# Patient Record
Sex: FEMALE | Race: WHITE | NOT HISPANIC OR LATINO | Employment: FULL TIME | URBAN - METROPOLITAN AREA
[De-identification: names, ages, dates, MRNs, and addresses within clinical notes are randomized per-mention and may not be internally consistent; named-entity substitution may affect disease eponyms.]

---

## 2024-02-14 ENCOUNTER — APPOINTMENT (OUTPATIENT)
Dept: ADMISSIONS | Facility: MEDICAL CENTER | Age: 79
End: 2024-02-14
Attending: ORTHOPAEDIC SURGERY
Payer: OTHER MISCELLANEOUS

## 2024-02-16 ENCOUNTER — PRE-ADMISSION TESTING (OUTPATIENT)
Dept: ADMISSIONS | Facility: MEDICAL CENTER | Age: 79
End: 2024-02-16
Attending: ORTHOPAEDIC SURGERY
Payer: OTHER MISCELLANEOUS

## 2024-02-16 RX ORDER — FLUTICASONE PROPIONATE AND SALMETEROL 500; 50 UG/1; UG/1
POWDER RESPIRATORY (INHALATION)
COMMUNITY
Start: 2006-01-01 | End: 2024-06-29

## 2024-02-16 RX ORDER — MULTIVIT-MIN/IRON/FOLIC ACID/K 18-600-40
1000 CAPSULE ORAL DAILY
COMMUNITY

## 2024-02-16 RX ORDER — PANTOPRAZOLE SODIUM 40 MG/1
TABLET, DELAYED RELEASE ORAL EVERY MORNING
COMMUNITY
Start: 2006-01-01

## 2024-02-16 RX ORDER — LEVOTHYROXINE SODIUM 112 UG/1
TABLET ORAL EVERY MORNING
COMMUNITY
Start: 2023-05-02

## 2024-02-16 RX ORDER — LISINOPRIL 10 MG/1
TABLET ORAL
Status: ON HOLD | COMMUNITY
Start: 2004-01-01 | End: 2024-02-21

## 2024-02-16 RX ORDER — EMPAGLIFLOZIN 10 MG/1
TABLET, FILM COATED ORAL EVERY MORNING
COMMUNITY
Start: 2022-01-01

## 2024-02-16 RX ORDER — ALBUTEROL SULFATE 90 UG/1
AEROSOL, METERED RESPIRATORY (INHALATION) PRN
COMMUNITY
Start: 2023-12-07

## 2024-02-16 RX ORDER — ATORVASTATIN CALCIUM 10 MG/1
TABLET, FILM COATED ORAL EVERY MORNING
COMMUNITY
Start: 2015-01-01

## 2024-02-16 RX ORDER — GLIPIZIDE 5 MG/1
TABLET, FILM COATED, EXTENDED RELEASE ORAL 2 TIMES DAILY
COMMUNITY
Start: 2023-04-12

## 2024-02-16 NOTE — PREPROCEDURE INSTRUCTIONS
PreAdmit Telephone Appointment: Reviewed the Preparing for your procedure handout with patient over the phone. Patient instructed per pharmacy guidelines regarding taking, holding or contacting provider for instructions on regularly prescribed medications before surgery. Instructed to take the following medications the day of surgery with a sip of water per pharmacy medication guidelines: Atorvastatin, Levothyroxine, Pantoprazole, Wixela, albuterol if needed  Pt denies issues with anesthesia   Preadmitted 2/16, pt lives in [place] so preop testing will be on admit      Confirmed with patient where to check in morning of surgery. Handouts/Brochure/Video emailed to patient.

## 2024-02-19 ENCOUNTER — HOSPITAL ENCOUNTER (OUTPATIENT)
Facility: MEDICAL CENTER | Age: 79
End: 2024-02-21
Attending: ORTHOPAEDIC SURGERY | Admitting: ORTHOPAEDIC SURGERY
Payer: OTHER MISCELLANEOUS

## 2024-02-19 ENCOUNTER — HOSPITAL ENCOUNTER (EMERGENCY)
Facility: MEDICAL CENTER | Age: 79
End: 2024-02-19

## 2024-02-19 ENCOUNTER — APPOINTMENT (OUTPATIENT)
Dept: RADIOLOGY | Facility: MEDICAL CENTER | Age: 79
End: 2024-02-19
Attending: ANESTHESIOLOGY
Payer: OTHER MISCELLANEOUS

## 2024-02-19 ENCOUNTER — ANESTHESIA (OUTPATIENT)
Dept: SURGERY | Facility: MEDICAL CENTER | Age: 79
End: 2024-02-19
Payer: OTHER MISCELLANEOUS

## 2024-02-19 ENCOUNTER — ANESTHESIA EVENT (OUTPATIENT)
Dept: SURGERY | Facility: MEDICAL CENTER | Age: 79
End: 2024-02-19
Payer: OTHER MISCELLANEOUS

## 2024-02-19 DIAGNOSIS — M75.121 NONTRAUMATIC COMPLETE TEAR OF RIGHT ROTATOR CUFF: ICD-10-CM

## 2024-02-19 DIAGNOSIS — J44.9 CHRONIC OBSTRUCTIVE PULMONARY DISEASE, UNSPECIFIED COPD TYPE (HCC): ICD-10-CM

## 2024-02-19 DIAGNOSIS — I10 HYPERTENSION, UNSPECIFIED TYPE: ICD-10-CM

## 2024-02-19 PROBLEM — M75.101 RIGHT ROTATOR CUFF TEAR: Status: ACTIVE | Noted: 2024-02-19

## 2024-02-19 LAB
ANION GAP SERPL CALC-SCNC: 10 MMOL/L (ref 7–16)
BUN SERPL-MCNC: 17 MG/DL (ref 8–22)
CALCIUM SERPL-MCNC: 9 MG/DL (ref 8.4–10.2)
CHLORIDE SERPL-SCNC: 101 MMOL/L (ref 96–112)
CO2 SERPL-SCNC: 27 MMOL/L (ref 20–33)
CREAT SERPL-MCNC: 0.62 MG/DL (ref 0.5–1.4)
EKG IMPRESSION: NORMAL
GFR SERPLBLD CREATININE-BSD FMLA CKD-EPI: 92 ML/MIN/1.73 M 2
GLUCOSE BLD STRIP.AUTO-MCNC: 130 MG/DL (ref 65–99)
GLUCOSE BLD STRIP.AUTO-MCNC: 130 MG/DL (ref 65–99)
GLUCOSE BLD STRIP.AUTO-MCNC: 163 MG/DL (ref 65–99)
GLUCOSE SERPL-MCNC: 159 MG/DL (ref 65–99)
POTASSIUM SERPL-SCNC: 4.4 MMOL/L (ref 3.6–5.5)
SODIUM SERPL-SCNC: 138 MMOL/L (ref 135–145)

## 2024-02-19 PROCEDURE — 160002 HCHG RECOVERY MINUTES (STAT): Performed by: ORTHOPAEDIC SURGERY

## 2024-02-19 PROCEDURE — 160009 HCHG ANES TIME/MIN: Performed by: ORTHOPAEDIC SURGERY

## 2024-02-19 PROCEDURE — 700105 HCHG RX REV CODE 258: Performed by: ORTHOPAEDIC SURGERY

## 2024-02-19 PROCEDURE — 502240 HCHG MISC OR SUPPLY RC 0272: Performed by: ORTHOPAEDIC SURGERY

## 2024-02-19 PROCEDURE — A9270 NON-COVERED ITEM OR SERVICE: HCPCS | Performed by: ORTHOPAEDIC SURGERY

## 2024-02-19 PROCEDURE — 94640 AIRWAY INHALATION TREATMENT: CPT

## 2024-02-19 PROCEDURE — 700111 HCHG RX REV CODE 636 W/ 250 OVERRIDE (IP): Mod: JZ | Performed by: ANESTHESIOLOGY

## 2024-02-19 PROCEDURE — 700102 HCHG RX REV CODE 250 W/ 637 OVERRIDE(OP): Performed by: ORTHOPAEDIC SURGERY

## 2024-02-19 PROCEDURE — 93005 ELECTROCARDIOGRAM TRACING: CPT | Performed by: ORTHOPAEDIC SURGERY

## 2024-02-19 PROCEDURE — 94760 N-INVAS EAR/PLS OXIMETRY 1: CPT

## 2024-02-19 PROCEDURE — 160036 HCHG PACU - EA ADDL 30 MINS PHASE I: Performed by: ORTHOPAEDIC SURGERY

## 2024-02-19 PROCEDURE — 700102 HCHG RX REV CODE 250 W/ 637 OVERRIDE(OP)

## 2024-02-19 PROCEDURE — 700101 HCHG RX REV CODE 250: Performed by: ANESTHESIOLOGY

## 2024-02-19 PROCEDURE — 700105 HCHG RX REV CODE 258: Performed by: ANESTHESIOLOGY

## 2024-02-19 PROCEDURE — 93010 ELECTROCARDIOGRAM REPORT: CPT | Performed by: INTERNAL MEDICINE

## 2024-02-19 PROCEDURE — 700101 HCHG RX REV CODE 250: Performed by: ORTHOPAEDIC SURGERY

## 2024-02-19 PROCEDURE — 80048 BASIC METABOLIC PNL TOTAL CA: CPT

## 2024-02-19 PROCEDURE — 700102 HCHG RX REV CODE 250 W/ 637 OVERRIDE(OP): Performed by: ANESTHESIOLOGY

## 2024-02-19 PROCEDURE — 64415 NJX AA&/STRD BRCH PLXS IMG: CPT | Performed by: ORTHOPAEDIC SURGERY

## 2024-02-19 PROCEDURE — C1713 ANCHOR/SCREW BN/BN,TIS/BN: HCPCS | Performed by: ORTHOPAEDIC SURGERY

## 2024-02-19 PROCEDURE — A9270 NON-COVERED ITEM OR SERVICE: HCPCS

## 2024-02-19 PROCEDURE — 82962 GLUCOSE BLOOD TEST: CPT | Mod: 91

## 2024-02-19 PROCEDURE — 36415 COLL VENOUS BLD VENIPUNCTURE: CPT

## 2024-02-19 PROCEDURE — 770030 HCHG ROOM/CARE - EXTENDED RECOVERY EACH 15 MIN

## 2024-02-19 PROCEDURE — 502000 HCHG MISC OR IMPLANTS RC 0278: Performed by: ORTHOPAEDIC SURGERY

## 2024-02-19 PROCEDURE — 160048 HCHG OR STATISTICAL LEVEL 1-5: Performed by: ORTHOPAEDIC SURGERY

## 2024-02-19 PROCEDURE — 700111 HCHG RX REV CODE 636 W/ 250 OVERRIDE (IP): Performed by: ORTHOPAEDIC SURGERY

## 2024-02-19 PROCEDURE — A9270 NON-COVERED ITEM OR SERVICE: HCPCS | Performed by: ANESTHESIOLOGY

## 2024-02-19 PROCEDURE — 160041 HCHG SURGERY MINUTES - EA ADDL 1 MIN LEVEL 4: Performed by: ORTHOPAEDIC SURGERY

## 2024-02-19 PROCEDURE — 71045 X-RAY EXAM CHEST 1 VIEW: CPT

## 2024-02-19 PROCEDURE — 160035 HCHG PACU - 1ST 60 MINS PHASE I: Performed by: ORTHOPAEDIC SURGERY

## 2024-02-19 PROCEDURE — 160029 HCHG SURGERY MINUTES - 1ST 30 MINS LEVEL 4: Performed by: ORTHOPAEDIC SURGERY

## 2024-02-19 DEVICE — IMPLANTABLE DEVICE: Type: IMPLANTABLE DEVICE | Status: FUNCTIONAL

## 2024-02-19 RX ORDER — ONDANSETRON 2 MG/ML
4 INJECTION INTRAMUSCULAR; INTRAVENOUS EVERY 4 HOURS PRN
Status: DISCONTINUED | OUTPATIENT
Start: 2024-02-19 | End: 2024-02-21 | Stop reason: HOSPADM

## 2024-02-19 RX ORDER — MIDAZOLAM HYDROCHLORIDE 1 MG/ML
INJECTION INTRAMUSCULAR; INTRAVENOUS PRN
Status: DISCONTINUED | OUTPATIENT
Start: 2024-02-19 | End: 2024-02-19 | Stop reason: SURG

## 2024-02-19 RX ORDER — OMEPRAZOLE 20 MG/1
20 CAPSULE, DELAYED RELEASE ORAL DAILY
Status: DISCONTINUED | OUTPATIENT
Start: 2024-02-19 | End: 2024-02-21 | Stop reason: HOSPADM

## 2024-02-19 RX ORDER — ROCURONIUM BROMIDE 10 MG/ML
INJECTION, SOLUTION INTRAVENOUS PRN
Status: DISCONTINUED | OUTPATIENT
Start: 2024-02-19 | End: 2024-02-19 | Stop reason: SURG

## 2024-02-19 RX ORDER — HYDROMORPHONE HYDROCHLORIDE 1 MG/ML
0.1 INJECTION, SOLUTION INTRAMUSCULAR; INTRAVENOUS; SUBCUTANEOUS
Status: DISCONTINUED | OUTPATIENT
Start: 2024-02-19 | End: 2024-02-19 | Stop reason: HOSPADM

## 2024-02-19 RX ORDER — GLIPIZIDE 2.5 MG/1
5 TABLET, EXTENDED RELEASE ORAL 2 TIMES DAILY
Status: DISCONTINUED | OUTPATIENT
Start: 2024-02-20 | End: 2024-02-21 | Stop reason: HOSPADM

## 2024-02-19 RX ORDER — BUPIVACAINE HYDROCHLORIDE 2.5 MG/ML
INJECTION, SOLUTION EPIDURAL; INFILTRATION; INTRACAUDAL
Status: COMPLETED
Start: 2024-02-19 | End: 2024-02-19

## 2024-02-19 RX ORDER — OXYCODONE HYDROCHLORIDE 5 MG/1
5 TABLET ORAL
Status: DISCONTINUED | OUTPATIENT
Start: 2024-02-19 | End: 2024-02-21 | Stop reason: HOSPADM

## 2024-02-19 RX ORDER — HALOPERIDOL 5 MG/ML
1 INJECTION INTRAMUSCULAR
Status: DISCONTINUED | OUTPATIENT
Start: 2024-02-19 | End: 2024-02-19 | Stop reason: HOSPADM

## 2024-02-19 RX ORDER — BUPIVACAINE HYDROCHLORIDE 2.5 MG/ML
INJECTION, SOLUTION EPIDURAL; INFILTRATION; INTRACAUDAL PRN
Status: DISCONTINUED | OUTPATIENT
Start: 2024-02-19 | End: 2024-02-19 | Stop reason: SURG

## 2024-02-19 RX ORDER — FLUTICASONE PROPIONATE AND SALMETEROL 500; 50 UG/1; UG/1
1 POWDER RESPIRATORY (INHALATION)
Status: DISCONTINUED | OUTPATIENT
Start: 2024-02-19 | End: 2024-02-19

## 2024-02-19 RX ORDER — ONDANSETRON 2 MG/ML
4 INJECTION INTRAMUSCULAR; INTRAVENOUS
Status: DISCONTINUED | OUTPATIENT
Start: 2024-02-19 | End: 2024-02-19 | Stop reason: HOSPADM

## 2024-02-19 RX ORDER — LEVOTHYROXINE SODIUM 112 UG/1
112 TABLET ORAL EVERY MORNING
Status: DISCONTINUED | OUTPATIENT
Start: 2024-02-19 | End: 2024-02-21 | Stop reason: HOSPADM

## 2024-02-19 RX ORDER — EPHEDRINE SULFATE 50 MG/ML
5 INJECTION, SOLUTION INTRAVENOUS
Status: DISCONTINUED | OUTPATIENT
Start: 2024-02-19 | End: 2024-02-19 | Stop reason: HOSPADM

## 2024-02-19 RX ORDER — OXYCODONE HYDROCHLORIDE 10 MG/1
10 TABLET ORAL
Status: DISCONTINUED | OUTPATIENT
Start: 2024-02-19 | End: 2024-02-21 | Stop reason: HOSPADM

## 2024-02-19 RX ORDER — SODIUM CHLORIDE, SODIUM LACTATE, POTASSIUM CHLORIDE, CALCIUM CHLORIDE 600; 310; 30; 20 MG/100ML; MG/100ML; MG/100ML; MG/100ML
INJECTION, SOLUTION INTRAVENOUS CONTINUOUS
Status: DISCONTINUED | OUTPATIENT
Start: 2024-02-19 | End: 2024-02-20

## 2024-02-19 RX ORDER — HYDROMORPHONE HYDROCHLORIDE 1 MG/ML
0.4 INJECTION, SOLUTION INTRAMUSCULAR; INTRAVENOUS; SUBCUTANEOUS
Status: DISCONTINUED | OUTPATIENT
Start: 2024-02-19 | End: 2024-02-19 | Stop reason: HOSPADM

## 2024-02-19 RX ORDER — SODIUM CHLORIDE, SODIUM LACTATE, POTASSIUM CHLORIDE, CALCIUM CHLORIDE 600; 310; 30; 20 MG/100ML; MG/100ML; MG/100ML; MG/100ML
INJECTION, SOLUTION INTRAVENOUS
Status: DISCONTINUED | OUTPATIENT
Start: 2024-02-19 | End: 2024-02-19 | Stop reason: SURG

## 2024-02-19 RX ORDER — ONDANSETRON 2 MG/ML
INJECTION INTRAMUSCULAR; INTRAVENOUS PRN
Status: DISCONTINUED | OUTPATIENT
Start: 2024-02-19 | End: 2024-02-19 | Stop reason: SURG

## 2024-02-19 RX ORDER — EPINEPHRINE 1 MG/ML(1)
AMPUL (ML) INJECTION PRN
Status: DISCONTINUED | OUTPATIENT
Start: 2024-02-19 | End: 2024-02-19 | Stop reason: SURG

## 2024-02-19 RX ORDER — HYDROMORPHONE HYDROCHLORIDE 1 MG/ML
0.2 INJECTION, SOLUTION INTRAMUSCULAR; INTRAVENOUS; SUBCUTANEOUS
Status: DISCONTINUED | OUTPATIENT
Start: 2024-02-19 | End: 2024-02-19 | Stop reason: HOSPADM

## 2024-02-19 RX ORDER — CEFAZOLIN SODIUM 1 G/3ML
INJECTION, POWDER, FOR SOLUTION INTRAMUSCULAR; INTRAVENOUS PRN
Status: DISCONTINUED | OUTPATIENT
Start: 2024-02-19 | End: 2024-02-19 | Stop reason: SURG

## 2024-02-19 RX ORDER — SODIUM CHLORIDE, SODIUM LACTATE, POTASSIUM CHLORIDE, CALCIUM CHLORIDE 600; 310; 30; 20 MG/100ML; MG/100ML; MG/100ML; MG/100ML
INJECTION, SOLUTION INTRAVENOUS CONTINUOUS
Status: ACTIVE | OUTPATIENT
Start: 2024-02-19 | End: 2024-02-19

## 2024-02-19 RX ORDER — VASOPRESSIN 20 U/ML
INJECTION PARENTERAL PRN
Status: DISCONTINUED | OUTPATIENT
Start: 2024-02-19 | End: 2024-02-19 | Stop reason: SURG

## 2024-02-19 RX ORDER — ACETAMINOPHEN 325 MG/1
650 TABLET ORAL EVERY 6 HOURS
Status: DISCONTINUED | OUTPATIENT
Start: 2024-02-19 | End: 2024-02-21 | Stop reason: HOSPADM

## 2024-02-19 RX ORDER — TRANEXAMIC ACID 100 MG/ML
INJECTION, SOLUTION INTRAVENOUS PRN
Status: DISCONTINUED | OUTPATIENT
Start: 2024-02-19 | End: 2024-02-19 | Stop reason: SURG

## 2024-02-19 RX ORDER — LIDOCAINE HYDROCHLORIDE 20 MG/ML
INJECTION, SOLUTION EPIDURAL; INFILTRATION; INTRACAUDAL; PERINEURAL PRN
Status: DISCONTINUED | OUTPATIENT
Start: 2024-02-19 | End: 2024-02-19 | Stop reason: SURG

## 2024-02-19 RX ORDER — DEXTROSE MONOHYDRATE 25 G/50ML
25 INJECTION, SOLUTION INTRAVENOUS
Status: DISCONTINUED | OUTPATIENT
Start: 2024-02-19 | End: 2024-02-21 | Stop reason: HOSPADM

## 2024-02-19 RX ORDER — ATORVASTATIN CALCIUM 10 MG/1
10 TABLET, FILM COATED ORAL EVERY MORNING
Status: DISCONTINUED | OUTPATIENT
Start: 2024-02-19 | End: 2024-02-21 | Stop reason: HOSPADM

## 2024-02-19 RX ORDER — ASPIRIN 325 MG
325 TABLET ORAL DAILY
Status: DISCONTINUED | OUTPATIENT
Start: 2024-02-20 | End: 2024-02-21 | Stop reason: HOSPADM

## 2024-02-19 RX ORDER — ACETAMINOPHEN 325 MG/1
650 TABLET ORAL EVERY 6 HOURS PRN
Status: DISCONTINUED | OUTPATIENT
Start: 2024-02-24 | End: 2024-02-21 | Stop reason: HOSPADM

## 2024-02-19 RX ORDER — LISINOPRIL 5 MG/1
10 TABLET ORAL
Status: DISCONTINUED | OUTPATIENT
Start: 2024-02-19 | End: 2024-02-21

## 2024-02-19 RX ORDER — ALBUTEROL SULFATE 90 UG/1
AEROSOL, METERED RESPIRATORY (INHALATION) PRN
Status: DISCONTINUED | OUTPATIENT
Start: 2024-02-19 | End: 2024-02-19 | Stop reason: SURG

## 2024-02-19 RX ORDER — OXYCODONE HCL 5 MG/5 ML
10 SOLUTION, ORAL ORAL
Status: DISCONTINUED | OUTPATIENT
Start: 2024-02-19 | End: 2024-02-19 | Stop reason: HOSPADM

## 2024-02-19 RX ORDER — HYDRALAZINE HYDROCHLORIDE 20 MG/ML
5 INJECTION INTRAMUSCULAR; INTRAVENOUS
Status: DISCONTINUED | OUTPATIENT
Start: 2024-02-19 | End: 2024-02-19 | Stop reason: HOSPADM

## 2024-02-19 RX ORDER — GLIPIZIDE 2.5 MG/1
5 TABLET, EXTENDED RELEASE ORAL 2 TIMES DAILY
Status: DISCONTINUED | OUTPATIENT
Start: 2024-02-19 | End: 2024-02-19

## 2024-02-19 RX ORDER — BUPIVACAINE HYDROCHLORIDE AND EPINEPHRINE 2.5; 5 MG/ML; UG/ML
INJECTION, SOLUTION EPIDURAL; INFILTRATION; INTRACAUDAL; PERINEURAL
Status: DISCONTINUED | OUTPATIENT
Start: 2024-02-19 | End: 2024-02-19 | Stop reason: HOSPADM

## 2024-02-19 RX ORDER — METOPROLOL TARTRATE 1 MG/ML
1 INJECTION, SOLUTION INTRAVENOUS
Status: DISCONTINUED | OUTPATIENT
Start: 2024-02-19 | End: 2024-02-19 | Stop reason: HOSPADM

## 2024-02-19 RX ORDER — SODIUM CHLORIDE, SODIUM LACTATE, POTASSIUM CHLORIDE, CALCIUM CHLORIDE 600; 310; 30; 20 MG/100ML; MG/100ML; MG/100ML; MG/100ML
INJECTION, SOLUTION INTRAVENOUS CONTINUOUS
Status: DISCONTINUED | OUTPATIENT
Start: 2024-02-19 | End: 2024-02-19 | Stop reason: HOSPADM

## 2024-02-19 RX ORDER — ALBUTEROL SULFATE 90 UG/1
2 AEROSOL, METERED RESPIRATORY (INHALATION) EVERY 4 HOURS PRN
Status: DISCONTINUED | OUTPATIENT
Start: 2024-02-19 | End: 2024-02-21 | Stop reason: HOSPADM

## 2024-02-19 RX ORDER — OXYCODONE HCL 5 MG/5 ML
5 SOLUTION, ORAL ORAL
Status: DISCONTINUED | OUTPATIENT
Start: 2024-02-19 | End: 2024-02-19 | Stop reason: HOSPADM

## 2024-02-19 RX ORDER — PANTOPRAZOLE SODIUM 40 MG/1
40 TABLET, DELAYED RELEASE ORAL DAILY
Status: DISCONTINUED | OUTPATIENT
Start: 2024-02-19 | End: 2024-02-19

## 2024-02-19 RX ORDER — ALBUTEROL SULFATE 90 UG/1
2 AEROSOL, METERED RESPIRATORY (INHALATION)
Status: DISCONTINUED | OUTPATIENT
Start: 2024-02-19 | End: 2024-02-19

## 2024-02-19 RX ORDER — LABETALOL HYDROCHLORIDE 5 MG/ML
5 INJECTION, SOLUTION INTRAVENOUS
Status: DISCONTINUED | OUTPATIENT
Start: 2024-02-19 | End: 2024-02-19 | Stop reason: HOSPADM

## 2024-02-19 RX ORDER — HYDROMORPHONE HYDROCHLORIDE 1 MG/ML
0.5 INJECTION, SOLUTION INTRAMUSCULAR; INTRAVENOUS; SUBCUTANEOUS
Status: DISCONTINUED | OUTPATIENT
Start: 2024-02-19 | End: 2024-02-21 | Stop reason: HOSPADM

## 2024-02-19 RX ADMIN — ACETAMINOPHEN 650 MG: 325 TABLET ORAL at 11:09

## 2024-02-19 RX ADMIN — CEFAZOLIN 2 G: 1 INJECTION, POWDER, FOR SOLUTION INTRAMUSCULAR; INTRAVENOUS at 07:15

## 2024-02-19 RX ADMIN — ATORVASTATIN CALCIUM 10 MG: 10 TABLET, FILM COATED ORAL at 10:32

## 2024-02-19 RX ADMIN — MOMETASONE FUROATE AND FORMOTEROL FUMARATE DIHYDRATE 2 PUFF: 200; 5 AEROSOL RESPIRATORY (INHALATION) at 20:42

## 2024-02-19 RX ADMIN — ALBUTEROL SULFATE 4 PUFF: 90 AEROSOL, METERED RESPIRATORY (INHALATION) at 08:22

## 2024-02-19 RX ADMIN — VASOPRESSIN 1 UNITS: 20 INJECTION INTRAVENOUS at 08:06

## 2024-02-19 RX ADMIN — LISINOPRIL 10 MG: 5 TABLET ORAL at 10:32

## 2024-02-19 RX ADMIN — SUGAMMADEX 400 MG: 100 INJECTION, SOLUTION INTRAVENOUS at 08:26

## 2024-02-19 RX ADMIN — PROPOFOL 160 MG: 10 INJECTION, EMULSION INTRAVENOUS at 07:15

## 2024-02-19 RX ADMIN — EPINEPHRINE 5 MCG: 1 INJECTION, SOLUTION INTRAMUSCULAR; SUBCUTANEOUS at 08:14

## 2024-02-19 RX ADMIN — LEVOTHYROXINE SODIUM 112 MCG: 0.11 TABLET ORAL at 10:32

## 2024-02-19 RX ADMIN — ACETAMINOPHEN 650 MG: 325 TABLET ORAL at 23:18

## 2024-02-19 RX ADMIN — TRANEXAMIC ACID 1000 MG: 100 INJECTION, SOLUTION INTRAVENOUS at 07:15

## 2024-02-19 RX ADMIN — ROCURONIUM BROMIDE 100 MG: 50 INJECTION, SOLUTION INTRAVENOUS at 07:15

## 2024-02-19 RX ADMIN — CEFAZOLIN 2 G: 2 INJECTION, POWDER, FOR SOLUTION INTRAMUSCULAR; INTRAVENOUS at 10:27

## 2024-02-19 RX ADMIN — BUPIVACAINE HYDROCHLORIDE 20 ML: 2.5 INJECTION, SOLUTION EPIDURAL; INFILTRATION; INTRACAUDAL at 07:02

## 2024-02-19 RX ADMIN — ONDANSETRON 4 MG: 2 INJECTION INTRAMUSCULAR; INTRAVENOUS at 08:21

## 2024-02-19 RX ADMIN — VASOPRESSIN 1 UNITS: 20 INJECTION INTRAVENOUS at 07:24

## 2024-02-19 RX ADMIN — MIDAZOLAM HYDROCHLORIDE 0.5 MG: 1 INJECTION, SOLUTION INTRAMUSCULAR; INTRAVENOUS at 06:58

## 2024-02-19 RX ADMIN — ALBUTEROL SULFATE 2 PUFF: 90 AEROSOL, METERED RESPIRATORY (INHALATION) at 11:08

## 2024-02-19 RX ADMIN — EPINEPHRINE 5 MCG: 1 INJECTION, SOLUTION INTRAMUSCULAR; SUBCUTANEOUS at 07:23

## 2024-02-19 RX ADMIN — GLIPIZIDE 5 MG: 2.5 TABLET, EXTENDED RELEASE ORAL at 14:33

## 2024-02-19 RX ADMIN — EPINEPHRINE 5 MCG: 1 INJECTION, SOLUTION INTRAMUSCULAR; SUBCUTANEOUS at 08:06

## 2024-02-19 RX ADMIN — OMEPRAZOLE 20 MG: 20 CAPSULE, DELAYED RELEASE ORAL at 10:32

## 2024-02-19 RX ADMIN — FENTANYL CITRATE 50 MCG: 50 INJECTION, SOLUTION INTRAMUSCULAR; INTRAVENOUS at 07:15

## 2024-02-19 RX ADMIN — SODIUM CHLORIDE, POTASSIUM CHLORIDE, SODIUM LACTATE AND CALCIUM CHLORIDE: 600; 310; 30; 20 INJECTION, SOLUTION INTRAVENOUS at 07:08

## 2024-02-19 RX ADMIN — FENTANYL CITRATE 50 MCG: 50 INJECTION, SOLUTION INTRAMUSCULAR; INTRAVENOUS at 06:58

## 2024-02-19 RX ADMIN — LIDOCAINE HYDROCHLORIDE 5 ML: 20 INJECTION, SOLUTION EPIDURAL; INFILTRATION; INTRACAUDAL at 06:58

## 2024-02-19 RX ADMIN — MOMETASONE FUROATE AND FORMOTEROL FUMARATE DIHYDRATE 2 PUFF: 200; 5 AEROSOL RESPIRATORY (INHALATION) at 11:10

## 2024-02-19 RX ADMIN — SODIUM CHLORIDE, POTASSIUM CHLORIDE, SODIUM LACTATE AND CALCIUM CHLORIDE: 600; 310; 30; 20 INJECTION, SOLUTION INTRAVENOUS at 10:23

## 2024-02-19 RX ADMIN — ACETAMINOPHEN 650 MG: 325 TABLET ORAL at 17:21

## 2024-02-19 RX ADMIN — VASOPRESSIN 1 UNITS: 20 INJECTION INTRAVENOUS at 08:14

## 2024-02-19 ASSESSMENT — COGNITIVE AND FUNCTIONAL STATUS - GENERAL
DRESSING REGULAR LOWER BODY CLOTHING: A LITTLE
STANDING UP FROM CHAIR USING ARMS: A LITTLE
DRESSING REGULAR UPPER BODY CLOTHING: A LITTLE
HELP NEEDED FOR BATHING: A LITTLE
TOILETING: A LITTLE
MOVING TO AND FROM BED TO CHAIR: A LITTLE
PERSONAL GROOMING: A LITTLE
SUGGESTED CMS G CODE MODIFIER DAILY ACTIVITY: CK
SUGGESTED CMS G CODE MODIFIER MOBILITY: CK
MOVING FROM LYING ON BACK TO SITTING ON SIDE OF FLAT BED: A LITTLE
EATING MEALS: A LITTLE
WALKING IN HOSPITAL ROOM: A LITTLE
DAILY ACTIVITIY SCORE: 18
MOBILITY SCORE: 18
CLIMB 3 TO 5 STEPS WITH RAILING: A LITTLE
TURNING FROM BACK TO SIDE WHILE IN FLAT BAD: A LITTLE

## 2024-02-19 ASSESSMENT — PAIN DESCRIPTION - PAIN TYPE
TYPE: SURGICAL PAIN

## 2024-02-19 ASSESSMENT — LIFESTYLE VARIABLES
TOTAL SCORE: 0
EVER FELT BAD OR GUILTY ABOUT YOUR DRINKING: NO
TOTAL SCORE: 0
HAVE PEOPLE ANNOYED YOU BY CRITICIZING YOUR DRINKING: NO
HAVE YOU EVER FELT YOU SHOULD CUT DOWN ON YOUR DRINKING: NO
TOTAL SCORE: 0
AVERAGE NUMBER OF DAYS PER WEEK YOU HAVE A DRINK CONTAINING ALCOHOL: 7
ALCOHOL_USE: YES
ON A TYPICAL DAY WHEN YOU DRINK ALCOHOL HOW MANY DRINKS DO YOU HAVE: 3
HOW MANY TIMES IN THE PAST YEAR HAVE YOU HAD 5 OR MORE DRINKS IN A DAY: 0
CONSUMPTION TOTAL: POSITIVE
EVER HAD A DRINK FIRST THING IN THE MORNING TO STEADY YOUR NERVES TO GET RID OF A HANGOVER: NO

## 2024-02-19 ASSESSMENT — PATIENT HEALTH QUESTIONNAIRE - PHQ9
1. LITTLE INTEREST OR PLEASURE IN DOING THINGS: NOT AT ALL
SUM OF ALL RESPONSES TO PHQ9 QUESTIONS 1 AND 2: 0
2. FEELING DOWN, DEPRESSED, IRRITABLE, OR HOPELESS: NOT AT ALL

## 2024-02-19 ASSESSMENT — PAIN SCALES - GENERAL: PAIN_LEVEL: 0

## 2024-02-19 NOTE — ANESTHESIA PROCEDURE NOTES
Airway    Date/Time: 2/19/2024 7:16 AM    Performed by: William Park D.O.  Authorized by: William Park D.O.    Location:  OR  Urgency:  Elective  Indications for Airway Management:  Anesthesia      Spontaneous Ventilation: absent    Sedation Level:  Deep  Preoxygenated: Yes    Patient Position:  Sniffing  Mask Difficulty Assessment:  0 - not attempted  Final Airway Type:  Endotracheal airway  Final Endotracheal Airway:  ETT  Cuffed: Yes    Technique Used for Successful ETT Placement:  Direct laryngoscopy    Insertion Site:  Oral  Blade Type:  Felipe  Laryngoscope Blade/Videolaryngoscope Blade Size:  3  ETT Size (mm):  7.0  Measured from:  Teeth  ETT to Teeth (cm):  22  Placement Verified by: auscultation and capnometry    Cormack-Lehane Classification:  Grade I - full view of glottis  Number of Attempts at Approach:  1

## 2024-02-19 NOTE — ANESTHESIA PREPROCEDURE EVALUATION
Case: [id number] Date/Time: 02/19/24 0645    Procedure: RIGHT SHOULDER ARTHROSCOPY, ROTATOR CUFF REPAIR, EXTENSIVE DEBRIDEMENT, BICEPS TENOTOMY VERSUS TENODESIS, SUBACROMIAL DECOMPRESSION, REPAIRS AS INDICATED (Right)    Pre-op diagnosis: IMPINGEMENT SYNDROME RIGHT SHOULDER REGION    Location: [place] [address] / SURGERY [facility]    Surgeons: Dread Wagner M.D.          COPD  Pulm HTN  Asthma  HTN  R Shoulder  Relevant Problems   No relevant active problems       Physical Exam    Airway   Mallampati: II  TM distance: >3 FB  Neck ROM: full       Cardiovascular - normal exam  Rhythm: regular  Rate: normal  (-) murmur     Dental - normal exam      Very poor dentition     Pulmonary   (+) decreased breath sounds     Abdominal    Neurological - normal exam                   Anesthesia Plan    ASA 3   ASA physical status 3 criteria: COPD - poorly controlled    Plan - general and peripheral nerve block     Peripheral nerve block will be post-op pain control  Airway plan will be ETT          Induction: intravenous    Postoperative Plan: Postoperative administration of opioids is intended.    Pertinent diagnostic labs and testing reviewed    Informed Consent:    Anesthetic plan and risks discussed with patient.    Use of blood products discussed with: patient whom consented to blood products.

## 2024-02-19 NOTE — PROGRESS NOTES
Pt arrived to floor from PACU. Assumed care of patient. Discussed daily plan of care, oriented patient to floor. On 4LPM oxygen via NC. Pt resting comfortably in bed, family at bedside. Pt denies complaints or concerns at this time. Right shoulder dressing clean, dry and intact. Call light and belongings within reach. Hourly rounding in place.

## 2024-02-19 NOTE — ANESTHESIA PROCEDURE NOTES
Peripheral Block    Date/Time: 2/19/2024 6:58 AM    Performed by: William Park D.O.  Authorized by: William Park D.O.    Patient Location:  Pre-op  Start Time:  2/19/2024 6:58 AM  End Time:  2/19/2024 7:02 AM  Reason for Block: at surgeon's request and post-op pain management ONLY    patient identified, IV checked, site marked, risks and benefits discussed, surgical consent, monitors and equipment checked, pre-op evaluation and timeout performed    Patient Position:  Supine  Prep: ChloraPrep    Monitoring:  Heart rate, continuous pulse ox and cardiac monitor  Block Region:  Upper Extremity  Upper Extremity - Block Type:  BRACHIAL PLEXUS block, Supraclavicular approach    Laterality:  Right  Procedures: ultrasound guided  Image captured, interpreted and electronically stored.  Local Infiltration:  Lidocaine  Strength:  1 %  Dose:  5 ml  Block Type:  Single-shot  Needle Length:  100mm  Needle Gauge:  21 G  Needle Localization:  Ultrasound guidance  Ultrasound picture in chart  Injection Assessment:  Negative aspiration for heme, no paresthesia on injection, incremental injection and local visualized surrounding nerve on ultrasound  Evidence of intravascular injection: No     US Guided Supraclavicular Brachial Plexus Block    US transducer placed cephalad and parallel to clavicle in angle to view the subclavian artery with the brachial plexus lateral and superficial to the artery. Needle inserted lateral to the transducer in-plane and advanced with the needle tip visualized continually into the perineural position. After negative aspiration, LA injected without resistance.

## 2024-02-19 NOTE — OR NURSING
0834: To PACU post RIGHT SHOULDER ARTHROSCOPY, ROTATOR CUFF REPAIR, SUBSCAP REPAIR, EXTENSIVE DEBRIDEMENT, BICEPS TENODESIS, SUBACROMIAL DECOMPRESSION w/ block. Pt is extubated, breathing is spontaneous and unlabored. Strong radial pulse observed on operative extremity. Ice applied to shoulder.    0851: Pt more awake. Able to sense touch to hand/fingers and is able to move them freely. No pain or nausea.    0936: Pt remains pain/nausea free. Meets criteria for room.

## 2024-02-19 NOTE — PROGRESS NOTES
4 Eyes Skin Assessment Completed by PRIMITIVO Contreras and PRIMITIVO Chen.    Head WDL  Ears WDL  Nose WDL  Mouth WDL  Neck WDL  Breast/Chest WDL  Shoulder Blades WDL  Spine Bruising  (R) Arm/Elbow/Hand/ shoulder- Incision CDI with sling  (L) Arm/Elbow/Hand Bruising and dry   Abdomen WDL  Groin Redness and Blanching  Scrotum/Coccyx/Buttocks Redness and Blanching  (R) Leg WDL  (L) Leg WDL  (R) Heel/Foot/Toe dry  (L) Heel/Foot/Toe dry          Devices In Places Blood Pressure Cuff, Pulse Ox, SCD's, and Nasal Cannula      Interventions In Place Gray Ear Foams and Pillows    Possible Skin Injury No    Pictures Uploaded Into Epic N/A  Wound Consult Placed N/A  RN Wound Prevention Protocol Ordered No

## 2024-02-19 NOTE — ANESTHESIA TIME REPORT
Anesthesia Start and Stop Event Times       Date Time Event    2/19/2024 0707 Ready for Procedure     0708 Anesthesia Start     0839 Anesthesia Stop          Responsible Staff  02/19/24      Name Role Begin End    William Park D.O. Anesth 0708 0839          Overtime Reason:  no overtime (within assigned shift)    Comments:

## 2024-02-19 NOTE — OP REPORT
OPERATIVE NOTE   Angelica Dietz   2/19/2024            PRE-OP DIAGNOSIS: Right shoulder rotator cuff tear of supraspinatus, labral tear, biceps tendinopathy            POST-OP DIAGNOSIS: Right shoulder rotator cuff tear of supraspinatus and subscapularis, partial-thickness tear of biceps tendon, SLAP tear and labral tear, chondromalacia of humeral head     PROCEDURE:   Right shoulder arthroscopy, rotator cuff repair of supraspinatus and subscapularis, biceps tenodesis, subacromial decompression, extensive labral debridement            SURGEON: Surgeon(s) and Role:     * Dread Wagner M.D. - Primary           ASSISTANT:  Tony Brewster PA-C (an assistant was necessary for positioning and facilitation of all critical portions of the procedure including tunnel drilling and anchor placement and suture along with rotator cuff repair)     ANESTHESIA: general and block by Dr. Park            ESTIMATED BLOOD LOSS: 5cc            DRAINS: none            TOTAL IV FLUIDS: see chart            SPECIMENS: * No specimens in log *            COMPLICATIONS: none            DISPOSITION: stable            INDICATION:   Patient is a pleasant 78-year-old female who injured her right shoulder while at work.  Workup was significant for full-thickness tear of the rotator cuff of the supraspinatus along with a labral tear and biceps tendinopathy.  She had failed conservative care.  Given her pain and weakness and failure of conservative care, we discussed rotator cuff repair.  I discussed the possibility of the tear being irreparable, or a recurrent tear in the future due to her age and tissue quality.  I also discussed the possibility of persistent pain due to some chondromalacia in her shoulder, although her chondromalacia did not warrant shoulder arthroplasty at this time.  She understood her options and elected to proceed with surgery for the right shoulder.            TECHNIQUE:   Patient was met in the preoperative area and  surgical site was marked.  Once again the procedure and the risk were discussed with her and consent was obtained.  She underwent an interscalene block by the anesthesia team without any complications.  She was then brought to the operating room and underwent general anesthesia.  She received preoperative antibiotics and transexemic acid.  An exam under anesthesia was performed she had full range of motion of the right shoulder and the glenohumeral joint was stable.  She was then placed in a beachchair position and all bony prominences were well-padded.  The head position was checked by the anesthesia team as well as myself.  The right upper extremity shoulder was then prepped and draped in usual sterile manner.  A timeout was performed confirming the correct patient, correct site, correct procedure.  We then began by forming a standard posterior portal into the glenohumeral joint.  The scope was then inserted to the shoulder and I formed a working anterior portal through the rotator interval.  A diagnostic arthroscopy was then performed.  There were some areas of grade III chondromalacia of the humeral head and there were areas of grade I-II chondromalacia of the glenoid.  There is extensive labral tearing anteriorly superiorly and posteriorly there was a large SLAP tear noted as well as well as 50% partial-thickness tearing of the long head of the biceps tendon.  Due to these findings, we elected to perform a biceps tenodesis.  I first tacked the bicep using a fiber link suture in a loop and tack fashion and then released the biceps in the superior labrum.  I will describe the tenodesis at a later point.  We then performed extensive debridement of the labrum anteriorly, superiorly, posteriorly, and of the biceps stump.  We then evaluated the subscapularis at this time which was noted to have upper border tearing along with interstitial tearing.  Due to this, we did repair the subscapularis.  I used a rasp and a  shaver to prepare the lesser tuberosity, and then we placed a fiber link suture in the upper border of the subscapularis and converted this to a luggage tag stitch.  At this point we had now had controlled the upper border the subscapularis and the biceps from our previous tag stitch.  We loaded both of the sutures into an Arthrex 4.75 mm swivel lock anchor which replaced the superior portion of the lesser tuberosity.  This had really good purchase, and it repaired the subscapularis and tenodesed the biceps in this area.  I rotated the shoulder and the humeral head and rotator cuff and biceps moved as 1 unit.  At this point I was satisfied with that.  We evaluate the rotator cuff of the supraspinatus and infraspinatus.  There is tearing on the undersurface.  I did alyssia this with PDS suture for later identification.  I then redirected the scope in the posterior portal into the subacromial space.  A lateral portal established and several resected was performed.  We identified the acromion which a type II spurring.  Therefore I excised the CA ligament and perform an acromioplasty using a 5.5 mm bone resector to debride this down to a smooth and stable edge.  Once the spurring was limited, I turned my attention to the rotator cuff itself.  A posterolateral viewing portal was established.  We identified the previous PDS suture marking our articular sided partial tear.  With the probe, it fell through the few remaining fibers on the bursal side, indicating a high-grade partial, essentially full-thickness tear of the rotator cuff in this region.  I debrided the fibers around the tear and also debrided the greater tuberosity.  At this point were ready for our rotator cuff repair of the supraspinatus.  I placed 2 separate Arthrex 4.75 mm swivel lock anchors with knotless cinch sutures in the medial row.  From each anchor, I passed the suture tapes as well as the knotless cinch sutures and 1 pass using a fiber link suture.   Once they were passed from both the anterior and posterior anchor, I then converted the knotless mechanisms.  I used the repair stitch from the posterior anchor and converted this into the anterior anchor, and vice versa.  By doing this, we created a medial pulley which we then cinched down to close down our medial row.  We were now ready for a lateral row.  We brought 1 suture tape limb from each of the medial row anchors and brought this to an anterior lateral row anchor, also 4.75 mm swivel lock anchor.  We then used the 2 remaining suture tape limbs and brought this to a posterior lateral anchor, also swivel lock anchor.  At this point we final tightened our medial pulley and we cut all the excess suture.  At this point we completed our 2 x 2 speed bridge repair with medial pulley, and there was excellent compression of the rotator cuff down to the footprint.  I rotated the shoulder and the humeral head and rotator cuff moved as 1 unit.  At this point I was satisfied the procedure.  I suctioned the fluid debris from the shoulder and removed her instruments.  The portal sites were closed with 3-0 nylon and sterile dressings were applied as well as a sling.  The patient was then awakened from general anesthesia without any complications, and taken to the PACU in stable condition.    Of note the 22 modifier is warranted in this case given the patient's elevated BMI of 40.73, and also we repaired 2 separate rotator cuff tendons.  This required extra surgical time to repair both the subscapularis and supraspinatus along with extra surgical anchors, compared to a standard 1 tendon repair, thus warranting a 22 modifier

## 2024-02-20 PROBLEM — G47.33 OBSTRUCTIVE SLEEP APNEA: Status: ACTIVE | Noted: 2024-02-20

## 2024-02-20 PROBLEM — E78.5 DYSLIPIDEMIA: Status: ACTIVE | Noted: 2024-02-20

## 2024-02-20 PROBLEM — E11.9 CONTROLLED TYPE 2 DIABETES MELLITUS WITHOUT COMPLICATION (HCC): Status: ACTIVE | Noted: 2024-02-20

## 2024-02-20 PROBLEM — I50.30 DIASTOLIC HEART FAILURE (HCC): Status: ACTIVE | Noted: 2024-02-20

## 2024-02-20 PROBLEM — J44.9 COPD (CHRONIC OBSTRUCTIVE PULMONARY DISEASE) (HCC): Status: ACTIVE | Noted: 2024-02-20

## 2024-02-20 PROBLEM — K21.9 GASTROESOPHAGEAL REFLUX DISEASE WITHOUT ESOPHAGITIS: Status: ACTIVE | Noted: 2024-02-20

## 2024-02-20 PROBLEM — I35.0 AORTIC STENOSIS: Status: ACTIVE | Noted: 2024-02-20

## 2024-02-20 PROBLEM — E03.9 HYPOTHYROIDISM: Status: ACTIVE | Noted: 2024-02-20

## 2024-02-20 PROBLEM — J96.01 ACUTE RESPIRATORY FAILURE WITH HYPOXIA (HCC): Status: ACTIVE | Noted: 2024-02-20

## 2024-02-20 PROBLEM — E66.01 MORBID OBESITY (HCC): Status: ACTIVE | Noted: 2024-02-20

## 2024-02-20 PROBLEM — I10 HYPERTENSION: Status: ACTIVE | Noted: 2024-02-20

## 2024-02-20 LAB
ERYTHROCYTE [DISTWIDTH] IN BLOOD BY AUTOMATED COUNT: 50.3 FL (ref 35.9–50)
GLUCOSE BLD STRIP.AUTO-MCNC: 138 MG/DL (ref 65–99)
GLUCOSE BLD STRIP.AUTO-MCNC: 163 MG/DL (ref 65–99)
GLUCOSE BLD STRIP.AUTO-MCNC: 198 MG/DL (ref 65–99)
GLUCOSE BLD STRIP.AUTO-MCNC: 201 MG/DL (ref 65–99)
HCT VFR BLD AUTO: 41.3 % (ref 37–47)
HGB BLD-MCNC: 13.2 G/DL (ref 12–16)
MCH RBC QN AUTO: 28.9 PG (ref 27–33)
MCHC RBC AUTO-ENTMCNC: 32 G/DL (ref 32.2–35.5)
MCV RBC AUTO: 90.6 FL (ref 81.4–97.8)
NT-PROBNP SERPL IA-MCNC: 116 PG/ML (ref 0–125)
PLATELET # BLD AUTO: 219 K/UL (ref 164–446)
PMV BLD AUTO: 9.9 FL (ref 9–12.9)
RBC # BLD AUTO: 4.56 M/UL (ref 4.2–5.4)
T3FREE SERPL-MCNC: 2.17 PG/ML (ref 2–4.4)
TSH SERPL DL<=0.005 MIU/L-ACNC: 1.17 UIU/ML (ref 0.38–5.33)
WBC # BLD AUTO: 15.1 K/UL (ref 4.8–10.8)

## 2024-02-20 PROCEDURE — 84443 ASSAY THYROID STIM HORMONE: CPT

## 2024-02-20 PROCEDURE — 700102 HCHG RX REV CODE 250 W/ 637 OVERRIDE(OP): Performed by: ORTHOPAEDIC SURGERY

## 2024-02-20 PROCEDURE — 700105 HCHG RX REV CODE 258: Performed by: ORTHOPAEDIC SURGERY

## 2024-02-20 PROCEDURE — A9270 NON-COVERED ITEM OR SERVICE: HCPCS | Performed by: ORTHOPAEDIC SURGERY

## 2024-02-20 PROCEDURE — 82962 GLUCOSE BLOOD TEST: CPT | Mod: 91

## 2024-02-20 PROCEDURE — 83880 ASSAY OF NATRIURETIC PEPTIDE: CPT

## 2024-02-20 PROCEDURE — 36415 COLL VENOUS BLD VENIPUNCTURE: CPT

## 2024-02-20 PROCEDURE — 94760 N-INVAS EAR/PLS OXIMETRY 1: CPT

## 2024-02-20 PROCEDURE — 84481 FREE ASSAY (FT-3): CPT

## 2024-02-20 PROCEDURE — 770030 HCHG ROOM/CARE - EXTENDED RECOVERY EACH 15 MIN

## 2024-02-20 PROCEDURE — 99254 IP/OBS CNSLTJ NEW/EST MOD 60: CPT | Performed by: HOSPITALIST

## 2024-02-20 PROCEDURE — 85027 COMPLETE CBC AUTOMATED: CPT

## 2024-02-20 RX ADMIN — INSULIN HUMAN 2 UNITS: 100 INJECTION, SOLUTION PARENTERAL at 11:17

## 2024-02-20 RX ADMIN — INSULIN HUMAN 2 UNITS: 100 INJECTION, SOLUTION PARENTERAL at 17:13

## 2024-02-20 RX ADMIN — OMEPRAZOLE 20 MG: 20 CAPSULE, DELAYED RELEASE ORAL at 05:09

## 2024-02-20 RX ADMIN — GLIPIZIDE 5 MG: 2.5 TABLET, EXTENDED RELEASE ORAL at 20:12

## 2024-02-20 RX ADMIN — ACETAMINOPHEN 650 MG: 325 TABLET ORAL at 17:13

## 2024-02-20 RX ADMIN — OXYCODONE HYDROCHLORIDE 10 MG: 10 TABLET ORAL at 20:12

## 2024-02-20 RX ADMIN — MOMETASONE FUROATE AND FORMOTEROL FUMARATE DIHYDRATE 2 PUFF: 200; 5 AEROSOL RESPIRATORY (INHALATION) at 20:13

## 2024-02-20 RX ADMIN — OXYCODONE HYDROCHLORIDE 10 MG: 10 TABLET ORAL at 23:17

## 2024-02-20 RX ADMIN — LEVOTHYROXINE SODIUM 112 MCG: 0.11 TABLET ORAL at 05:08

## 2024-02-20 RX ADMIN — ACETAMINOPHEN 650 MG: 325 TABLET ORAL at 23:17

## 2024-02-20 RX ADMIN — INSULIN HUMAN 3 UNITS: 100 INJECTION, SOLUTION PARENTERAL at 20:21

## 2024-02-20 RX ADMIN — ASPIRIN 325 MG: 325 TABLET ORAL at 05:08

## 2024-02-20 RX ADMIN — ATORVASTATIN CALCIUM 10 MG: 10 TABLET, FILM COATED ORAL at 05:08

## 2024-02-20 RX ADMIN — SODIUM CHLORIDE, POTASSIUM CHLORIDE, SODIUM LACTATE AND CALCIUM CHLORIDE: 600; 310; 30; 20 INJECTION, SOLUTION INTRAVENOUS at 01:28

## 2024-02-20 RX ADMIN — MOMETASONE FUROATE AND FORMOTEROL FUMARATE DIHYDRATE 2 PUFF: 200; 5 AEROSOL RESPIRATORY (INHALATION) at 09:13

## 2024-02-20 RX ADMIN — ACETAMINOPHEN 650 MG: 325 TABLET ORAL at 05:08

## 2024-02-20 RX ADMIN — LISINOPRIL 10 MG: 5 TABLET ORAL at 05:08

## 2024-02-20 RX ADMIN — ACETAMINOPHEN 650 MG: 325 TABLET ORAL at 11:14

## 2024-02-20 RX ADMIN — OXYCODONE HYDROCHLORIDE 5 MG: 5 TABLET ORAL at 15:56

## 2024-02-20 ASSESSMENT — PAIN DESCRIPTION - PAIN TYPE
TYPE: SURGICAL PAIN
TYPE: ACUTE PAIN
TYPE: SURGICAL PAIN
TYPE: ACUTE PAIN
TYPE: ACUTE PAIN
TYPE: SURGICAL PAIN

## 2024-02-20 ASSESSMENT — ENCOUNTER SYMPTOMS
NEUROLOGICAL NEGATIVE: 1
HEADACHES: 0
ABDOMINAL PAIN: 0
DIARRHEA: 0
PALPITATIONS: 0
WHEEZING: 0
FOCAL WEAKNESS: 0
VOMITING: 0
GASTROINTESTINAL NEGATIVE: 1
DIZZINESS: 0
CARDIOVASCULAR NEGATIVE: 1
SHORTNESS OF BREATH: 1
BRUISES/BLEEDS EASILY: 0
CONSTIPATION: 0
HEMOPTYSIS: 0
HEARTBURN: 0
BLOOD IN STOOL: 0
SEIZURES: 0
NERVOUS/ANXIOUS: 0
CONSTITUTIONAL NEGATIVE: 1
EYES NEGATIVE: 1
DOUBLE VISION: 0
LOSS OF CONSCIOUSNESS: 0
CHILLS: 0
DIAPHORESIS: 0
COUGH: 0
NAUSEA: 0
FEVER: 0
PSYCHIATRIC NEGATIVE: 1

## 2024-02-20 ASSESSMENT — VISUAL ACUITY: OU: 1

## 2024-02-20 NOTE — DIETARY
NUTRITION SERVICES: BMI - Pt with BMI >40 (=Body mass index is 40.73 kg/m².), Class III obesity. Weight loss counseling not appropriate in acute care setting. RECOMMEND - If appropriate at DC please refer to outpatient nutrition services for weight management.

## 2024-02-20 NOTE — PROGRESS NOTES
Received bedside patient report from PRIMITIVO Contreras. Patient is awake, alert & oriented x4. Patient on 2L nasal cannula, respirations unlabored. Dressing, sling, and polar ice in place to right shoulder. Patient resting in bed. Patient educated on call light use for needs. Plan of care discussed with patient. Belongings within reach. Bed at lowest position. Safety precautions in place.

## 2024-02-20 NOTE — DISCHARGE INSTRUCTIONS
Shoulder Arthroscopy, Care After  The following information offers guidance on how to care for yourself after your procedure. Your health care provider may also give you more specific instructions. If you have problems or questions, contact your health care provider.  What can I expect after the procedure?  After the procedure, it is common to have:  Pain.  Swelling.  A small amount of fluid from the incision.  Stiffness that improves over time.  Follow these instructions at home:  If you have a sling or an immobilizer:  Wear it as told by your health care provider. Remove it only as told by your health care provider. These devices protect your shoulder and help it heal by keeping it in place.  Check the skin around it every day. Tell your health care provider about any concerns.  Loosen it if your fingers tingle, become numb, or turn cold and blue.  Keep the sling or immobilizer clean.  If it is not waterproof:  Do not let it get wet.  Cover it with a watertight covering when you take a bath or a shower.  Incision care    Follow instructions from your health care provider about how to take care of your incisions. Make sure you:  Wash your hands with soap and water for at least 20 seconds before and after you change your bandage (dressing). If soap and water are not available, use hand .  Change your dressing as told by your health care provider.  Leave stitches (sutures), skin glue, or adhesive strips in place. These skin closures may need to stay in place for 2 weeks or longer. If adhesive strip edges start to loosen and curl up, you may trim the loose edges. Do not remove adhesive strips completely unless your health care provider tells you to do that.  Check your incision areas every day for signs of infection. Check for:  Redness.  More swelling or pain.  Blood or more fluid.  Warmth.  Pus or a bad smell.  Do not take baths, swim, or use a hot tub until your health care provider approves. Ask your  health care provider if you may take showers. You may only be allowed to take sponge baths.  Managing pain, stiffness, and swelling    If directed, put ice on the affected area. To do this:  If you have a removable sling or immobilizer, remove it as told by your health care provider.  Put ice in a plastic bag or use the icing device (cold therapy unit) that you were given. Follow instructions from your health care provider about how to use the icing device.  Place a towel between your skin and the bag or between your skin and the icing device.  Leave the ice on for 20 minutes, 2-3 times a day.  Remove the ice if your skin turns bright red. This is very important. If you cannot feel pain, heat, or cold, you have a greater risk of damage to the area.  Move your fingers often to reduce stiffness and swelling.  Raise (elevate) the injured area above the level of your heart while you are lying down.  It may help to sleep in a sitting position for a few days after your procedure. Try sleeping in a reclining chair or propping yourself up with extra pillows in bed.  Activity  Ask your health care provider what activities are safe for you during recovery.  Do not lift with your affected shoulder until your health care provider approves.  Avoid pulling and pushing with the arm on your affected side.  If physical therapy was prescribed, do exercises as directed. Doing exercises may help to improve shoulder movement and flexibility (range of motion).  Driving  Ask your health care provider when it is safe to drive if you have a sling or immobilizer.  Ask your health care provider if the medicine prescribed to you requires you to avoid driving or using machinery.  General instructions  Take over-the-counter and prescription medicines only as told by your health care provider.  Ask your health care provider if the medicine prescribed to you can cause constipation. You may need to take these actions to prevent or treat  constipation:  Drink enough fluid to keep your urine pale yellow.  Take over-the-counter or prescription medicines.  Eat foods that are high in fiber, such as beans, whole grains, and fresh fruits and vegetables.  Limit foods that are high in fat and processed sugars, such as fried or sweet foods.  Do not use any products that contain nicotine or tobacco. These products include cigarettes, chewing tobacco, and vaping devices, such as e-cigarettes. These can delay incision healing after surgery. If you need help quitting, ask your health care provider.  Keep all follow-up visits. This is important.  Contact a health care provider if:  You have a fever or chills.  You have severe pain.  You have any of these signs of infection:  Redness around an incision.  More swelling or pain in an incision area.  Blood or more fluid coming from an incision.  Warmth coming from an incision.  Pus or a bad smell coming from an incision.  You notice that an incision has opened up.  You develop a rash.  Get help right away if:  You have difficulty breathing.  You have chest pain.  You notice that your fingers tingle, are numb, or are cold and blue even after you loosen your sling or immobilizer.  You develop pain in your lower leg or at the back of your knee.  These symptoms may represent a serious problem that is an emergency. Do not wait to see if the symptoms will go away. Get medical help right away. Call your local emergency services (911 in the [place] Do not drive yourself to the hospital.  Summary  If you have a sling or an immobilizer, wear it as told by your health care provider.  It may help to sleep in a sitting position for a few days after your procedure.  If physical therapy was prescribed, do exercises as directed. Doing exercises may help to improve shoulder movement and flexibility (range of motion).  Keep all follow-up visits. This is important.  This information is not intended to replace advice given to you by your  health care provider. Make sure you discuss any questions you have with your health care provider.  Document Revised: 08/16/2021 Document Reviewed: 08/16/2021  Elsevier Patient Education © 2023 [employer]

## 2024-02-20 NOTE — DISCHARGE PLANNING
Case Management Discharge Planning    Admission Date: 2/19/2024  GMLOS:    ALOS: 0    6-Clicks ADL Score: 18  6-Clicks Mobility Score: 18      Anticipated Discharge Dispo: Discharge Disposition: Discharged to home/self care (01)    DME Needed: Yes    DME Ordered: Yes    Action(s) Taken: Spoke to pt at bedside regarding home O2. Choice dorm completed for [employer] and [facility]ed [facility]. Pt's physical address is [address] [place] [address] Alex. Space [address] [place] [id number].  Per Ahsan from [facility], they are able to supply O2 under worker's comp for pt.    1605: Spoke to Shira from [employer]. Per Shira , they cannot accept O2 referral under worker's comp.    1640: Spoke to Ahsan from [facility], Ahsan confirmed WC likely won't cover O2 as diagnosis listed is COPD, which was not caused by work injury.     Escalations Completed: None    Medically Clear: Yes    Next Steps: CM to f/u with pt and DME company tomorrow regarding options.    Barriers to Discharge: Oxygen Delivery

## 2024-02-20 NOTE — CARE PLAN
The patient is Stable - Low risk of patient condition declining or worsening    Shift Goals  Clinical Goals: Patient spo2 will be at or above 90%, ambulate to bathroom, pain 4/10 or less  Patient Goals: Rest      Progress made toward(s) clinical / shift goals:  Patient spo2 at or above 90% this shift, on 1-2L nasal cannula this shift. Patient able to ambulate and rest. Patient reports pain improvement after interventions.     Patient is not progressing towards the following goals: N/A

## 2024-02-20 NOTE — PROGRESS NOTES
"S:  Patient is doing well this morning.  No overnight events.  Pain if fairly well managed.  SpO2 has improved and is steadily holding in the low 90's with O2 therapy.  She has slowly been triturating off  the O2 therapy.    O:  On exam of the right upper extremity, sling is in place and sterile dressings are intact.  AIN/PIN/ulnar nerve is intact.  Radial/ulnar/ and median nerves are intact.  Radial pulse is 2+  BP (!) 168/75   Pulse 82   Temp 36.7 °C (98.1 °F) (Temporal)   Resp 18   Ht 1.575 m (5' 2\")   Wt 101 kg (222 lb 10.6 oz)   SpO2 92%      A:  Right shoulder arthroscopy, rotator cuff repair of supraspinatus and subscapularis, biceps tenodesis, subacromial decompression, extensive labral debridement     P:  Overall patient is doing much better.  As long as pain is well managed and her SpO2 is maintained in the low 90's or above she can be discharged.  Will follow up with us in clinic in 2 weeks for 1st post op.  "

## 2024-02-20 NOTE — ASSESSMENT & PLAN NOTE
Patient has been diagnosed at least 5 years ago with obstructive sleep apnea  She was prescribed a CPAP machine which she was using for some time and then about 5 years ago she says she lost the tubing to it when she moved up to [place] and has not been able to use it since.  Patient will need reevaluation to resume CPAP at nighttime.

## 2024-02-20 NOTE — ASSESSMENT & PLAN NOTE
Accu-Cheks with sliding scale coverage  Most recent hemoglobin A1c 6.8  Continue with glipizide 5 mg twice daily  Diabetic diet

## 2024-02-20 NOTE — FACE TO FACE
"Face to Face Note  -  Durable Medical Equipment    Amada Hassan M.D. - NPI: [id number]  I certify that this patient is under my care and that they had a durable medical equipment(DME)face to face encounter by myself that meets the physician DME face-to-face encounter requirements with this patient on:    Date of encounter:   Patient:                    MRN:                       YOB: 2022  Angelica Dietz  [id number]  1945     The encounter with the patient was in whole, or in part, for the following medical condition, which is the primary reason for durable medical equipment:  COPD    I certify that, based on my findings, the following durable medical equipment is medically necessary:    Oxygen   HOME O2 Saturation Measurements:(Values must be present for Home Oxygen orders)  Room air sat at rest: 86  Room air sat with amb: 83  With liters of O2: 2, O2 sat at rest with O2: 95  With Liters of O2: 3, O2 sat with amb with O2 : 91  Is the patient mobile?: Yes  If patient feels more short of breath, they can go up to 6 liters per minute and contact healthcare provider.    Supporting Symptoms: The patient requires supplemental oxygen, as the following interventions have been tried with limited or no improvement: \"Bronchodilators and/or steroid inhalers, \"Ambulation with oximetry, and \"Incentive spirometry.    My Clinical findings support the need for the above equipment due to:  Hypoxia  "

## 2024-02-20 NOTE — ASSESSMENT & PLAN NOTE
Status post surgery  Patient had a Worker's Comp. related rotator cuff repair as she apparently works for the [employer] service and while at work fell hitting her right shoulder into a cabinet that was at work and this shoved her entire hand into her shoulder causing a severe abnormality afterwards and pain.  Patient postoperatively will need pain management and follows with orthopedics

## 2024-02-20 NOTE — CONSULTS
Hospital Medicine Consultation    Date of Service  2/20/2024    Referring Physician  Dread Wagner M.D.    Consulting Physician  Amada Hassan M.D.    Reason for Consultation  Medical management with postoperative respiratory failure after right rotator cuff repair    History of Presenting Illness  78 y.o. female who presented 2/19/2024 with right shoulder pain.  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  She thus came into the hospital she was evaluated and authorized for surgical repair.  Postoperatively patient at this point is experiencing hypoxia.  Patient has a history of diastolic heart failure, aortic stenosis, diabetes mellitus type 2, sleep apnea, obesity and COPD from history of tobacco use which she quit in 2008.  Patient at this point postoperatively is not able to be weaned off oxygen will need home oxygen evaluation and discharge with oxygen to home.    Review of Systems  Review of Systems   Constitutional: Negative.  Negative for chills, diaphoresis and fever.   HENT: Negative.     Eyes: Negative.  Negative for double vision.   Respiratory:  Positive for shortness of breath. Negative for cough, hemoptysis and wheezing.    Cardiovascular: Negative.  Negative for chest pain, palpitations and leg swelling.   Gastrointestinal: Negative.  Negative for abdominal pain, blood in stool, constipation, diarrhea, heartburn, nausea and vomiting.   Genitourinary: Negative.  Negative for frequency, hematuria and urgency.   Musculoskeletal:  Positive for joint pain (Right shoulder).   Skin: Negative.  Negative for itching and rash.   Neurological: Negative.  Negative for dizziness, focal weakness, seizures, loss of consciousness and headaches.   Endo/Heme/Allergies: Negative.  Does not bruise/bleed easily.   Psychiatric/Behavioral: Negative.  Negative for suicidal ideas. The patient is not nervous/anxious.    All other systems  reviewed and are negative.      Past Medical History   has a past medical history of Asthma (1958), Breath shortness (1959), Bronchitis (2005), Cancer ([facility]) (2009), Cataract (Moderate), COPD (chronic obstructive pulmonary disease) (HCC), Dental disorder (Dentures), Diabetes (HCC) (2016), Disorder of thyroid, Heart murmur (2002), High cholesterol (2015), Hypertension (2018), Oxygen desaturation, Pneumonia (2004), Sleep apnea (2010), and Snoring (2005).    She has no past medical history of Acute nasopharyngitis, Anesthesia, Anginal syndrome (HCC), Arrhythmia, Arthritis, Blood clotting disorder (HCC), Bowel habit changes, Carcinoma in situ of respiratory system, Congestive heart failure (HCC), Continuous ambulatory peritoneal dialysis status (HCC), Coughing blood, Dialysis patient (HCC), Glaucoma, Gynecological disorder, Heart burn, Heart valve disease, Hemorrhagic disorder (HCC), Hepatitis A, Hepatitis B, Hepatitis C, Hiatus hernia syndrome, Infectious disease, Jaundice, Myocardial infarct (HCC), Pacemaker, Pain, Psychiatric problem, Renal disorder, Rheumatic fever, Seizure (HCC), Stroke (HCC), Tuberculosis, Urinary bladder disorder, or Urinary incontinence.    Surgical History   has a past surgical history that includes knee arthroscopy (Left); hysterectomy, total abdominal (1980); tonsillectomy (1955); hemorrhoidectomy; ankle arthroscopy (Right); bunionectomy (Left, 2012); and pr shldr arthroscop,diagnostic (Right, 2/19/2024).    Family History  family history includes Alcohol abuse in her father and mother; Breast Cancer in her maternal aunt; Cancer in her mother and sister; Diabetes in her son; Hyperlipidemia in her son; Hypertension in her son.    Social History   reports that she quit smoking about 17 years ago. Her smoking use included cigarettes. She started smoking about 43 years ago. She has a 28 pack-year smoking history. She has never used smokeless tobacco. She reports current alcohol use of about 12.6  oz of alcohol per week. She reports that she does not use drugs.    Medications  Prior to Admission Medications   Prescriptions Last Dose Informant Patient Reported? Taking?   Aspirin 325 MG Cap   Yes No   Sig: Take  by mouth every day.   JARDIANCE 10 MG Tab tablet 2024  Yes Yes   Sig: every morning.   Vitamin D, Cholecalciferol, (CHOLECALCIFEROL) 25 MCG (1000 UT) Tab   Yes No   Sig: Take 1,000 Units by mouth every day.   WIXELA INHUB 500-50 MCG/ACT AEROSOL POWDER, BREATH ACTIVATED 2024  Yes Yes   Sig: USE 1 INHALATION BY MOUTH  TWICE DAILY   albuterol 108 (90 Base) MCG/ACT Aero Soln inhalation aerosol 2024  Yes Yes   Sig: as needed.   atorvastatin (LIPITOR) 10 MG Tab 2024  Yes Yes   Sig: every morning.   glipiZIDE ER (GLUCOTROL XL) 5 MG TABLET SR 24 HR 2024  Yes Yes   Si times a day.   levothyroxine (SYNTHROID) 112 MCG Tab 2024  Yes Yes   Sig: every morning.   lisinopril (PRINIVIL) 10 MG Tab 2024  Yes Yes   pantoprazole (PROTONIX) 40 MG Tablet Delayed Response 2024  Yes Yes   Sig: every morning.      Facility-Administered Medications: None       Allergies  Allergies   Allergen Reactions    Other Misc Shortness of Breath     Cats asthmatic reaction       Physical Exam  Temp:  [35.9 °C (96.7 °F)-36.7 °C (98.1 °F)] 36 °C (96.8 °F)  Pulse:  [62-82] 73  Resp:  [18] 18  BP: (102-168)/(65-75) 102/67  SpO2:  [80 %-94 %] 93 %    Physical Exam  Vitals and nursing note reviewed. Exam conducted with a chaperone present.   Constitutional:       General: She is awake.      Appearance: Normal appearance. She is well-developed and well-groomed. She is obese. She is ill-appearing.   HENT:      Head: Normocephalic and atraumatic.      Jaw: There is normal jaw occlusion. No trismus.      Salivary Glands: Right salivary gland is not tender. Left salivary gland is not tender.      Right Ear: External ear normal.      Left Ear: External ear normal.      Mouth/Throat:      Mouth: Mucous  membranes are moist.      Pharynx: Oropharynx is clear.   Eyes:      General: Lids are normal. Vision grossly intact.      Extraocular Movements: Extraocular movements intact.      Conjunctiva/sclera: Conjunctivae normal.      Right eye: Right conjunctiva is not injected. No exudate.     Left eye: Left conjunctiva is not injected. No exudate.     Pupils: Pupils are equal, round, and reactive to light.   Neck:      Thyroid: No thyroid mass.      Vascular: No hepatojugular reflux or JVD.      Trachea: No abnormal tracheal secretions or tracheal deviation.   Cardiovascular:      Rate and Rhythm: Normal rate and regular rhythm. Occasional Extrasystoles are present.     Pulses: Normal pulses.      Heart sounds: Murmur heard.      No friction rub.   Pulmonary:      Effort: Pulmonary effort is normal.      Breath sounds: Decreased air movement present. Examination of the right-middle field reveals decreased breath sounds. Examination of the left-middle field reveals decreased breath sounds. Examination of the right-lower field reveals decreased breath sounds. Examination of the left-lower field reveals decreased breath sounds. Decreased breath sounds present. No wheezing or rhonchi.   Abdominal:      General: Abdomen is flat.      Palpations: Abdomen is soft.      Tenderness: There is no abdominal tenderness. There is no right CVA tenderness or left CVA tenderness.      Hernia: No hernia is present.   Musculoskeletal:      Right shoulder: Swelling and tenderness present. Decreased range of motion.      Cervical back: Full passive range of motion without pain, normal range of motion and neck supple. No rigidity. No muscular tenderness.      Right lower leg: No edema.      Left lower leg: No edema.   Lymphadenopathy:      Head:      Right side of head: No submental adenopathy.      Left side of head: No submental adenopathy.      Cervical:      Right cervical: No superficial cervical adenopathy.     Left cervical: No  superficial cervical adenopathy.      Upper Body:      Right upper body: No supraclavicular adenopathy.      Left upper body: No supraclavicular adenopathy.   Skin:     General: Skin is warm and dry.      Capillary Refill: Capillary refill takes less than 2 seconds.      Coloration: Skin is not cyanotic, jaundiced or pale.      Findings: No abrasion or bruising.   Neurological:      General: No focal deficit present.      Mental Status: She is alert and oriented to person, place, and time. Mental status is at baseline.      GCS: GCS eye subscore is 4. GCS verbal subscore is 5. GCS motor subscore is 6.      Cranial Nerves: No cranial nerve deficit.      Sensory: No sensory deficit.      Motor: Motor function is intact.      Deep Tendon Reflexes:      Reflex Scores:       Tricep reflexes are 2+ on the right side and 2+ on the left side.       Bicep reflexes are 2+ on the right side and 2+ on the left side.       Brachioradialis reflexes are 2+ on the right side and 2+ on the left side.       Patellar reflexes are 2+ on the right side and 2+ on the left side.       Achilles reflexes are 2+ on the right side and 2+ on the left side.  Psychiatric:         Attention and Perception: Attention and perception normal.         Mood and Affect: Mood normal.         Speech: Speech normal.         Behavior: Behavior is cooperative.         Thought Content: Thought content normal.         Cognition and Memory: Cognition and memory normal.         Judgment: Judgment normal.         Fluids  Date 02/20/24 0700 - 02/21/24 0659   Shift 0335-8118 9060-3883 3175-3537 24 Hour Total   INTAKE   P.O. 240   240   Shift Total 240   240   OUTPUT   Urine 400   400   Shift Total 400   400   Weight (kg) 101 101 101 101       Laboratory      Recent Labs     02/19/24  0615   SODIUM 138   POTASSIUM 4.4   CHLORIDE 101   CO2 27   GLUCOSE 159*   BUN 17   CREATININE 0.62   CALCIUM 9.0                     Imaging  DX-CHEST-PORTABLE (1 VIEW)   Final Result       1.  Multifocal mild atelectasis      2.  Enlarged cardiac silhouette          Assessment/Plan  * Right rotator cuff tear- (present on admission)  Assessment & Plan  Status post surgery  Patient had a Worker's Comp. related rotator cuff repair as she apparently works for the [employer] service and while at work fell hitting her right shoulder into a cabinet that was at work and this shoved her entire hand into her shoulder causing a severe abnormality afterwards and pain.  Patient postoperatively will need pain management and follows with orthopedics    Controlled type 2 diabetes mellitus without complication (HCC)- (present on admission)  Assessment & Plan  Accu-Cheks with sliding scale coverage  Most recent hemoglobin A1c 6.8  Continue with glipizide 5 mg twice daily  Diabetic diet    Acute respiratory failure with hypoxia (HCC)- (present on admission)  Assessment & Plan  Patient postoperatively is experiencing hypoxic respiratory failure and is requiring oxygen support  Patient will need bedside oxygen evaluation and possibly going home with oxygen  Patient has a history of sleep apnea and has not been compliant with her sleep apnea machine for about 5 years.  She says she has the machine at home but has lost the tubing to it and has not used it.  This could be causing severe pulmonary hypertension and most recent echocardiogram done down at [facility] does show that she has a left ventricular ejection fraction of 65% but significant pulmonary hypertension with her pulmonary pressures being elevated  Patient will need at this point preload reduction and resumption of her CPAP once she is able to get the necessary tubing for it.    Aortic stenosis- (present on admission)  Assessment & Plan  Grade 2 moderate aortic stenosis  Followed by cardiology as an outpatient  Currently not surgical    Obstructive sleep apnea- (present on admission)  Assessment & Plan  Patient has been diagnosed at least 5 years ago with  obstructive sleep apnea  She was prescribed a CPAP machine which she was using for some time and then about 5 years ago she says she lost the tubing to it when she moved up to [place] and has not been able to use it since.  Patient will need reevaluation to resume CPAP at nighttime.    Diastolic heart failure (HCC)- (present on admission)  Assessment & Plan  Patient has grade 1 diastolic heart failure and has elevated pulmonary pressures due to the sleep apnea  Patient will need to resume CPAP at nighttime  Patient will need preload reduction.  Check BNP level    Hypertension- (present on admission)  Assessment & Plan  Optimize blood pressure management keep systolic blood pressure less than 140 diastolic under 90  Continue lisinopril 10 mg p.o. daily    COPD (chronic obstructive pulmonary disease) (HCC)- (present on admission)  Assessment & Plan  Patient smoked until 2008 and she smoked about 30 years.  She has quit smoking since then and at this point has improved but does have underlying COPD and does require nebulizers and RT protocol.    Gastroesophageal reflux disease without esophagitis- (present on admission)  Assessment & Plan  PPI therapy with omeprazole  Currently reports no heartburn    Hypothyroidism- (present on admission)  Assessment & Plan  Continue with Synthroid 112 mcg p.o. daily  Check a TSH with reflex to T4 and T3.    Dyslipidemia- (present on admission)  Assessment & Plan  Low-fat low-cholesterol diet  Continue statin  Check a fasting lipid panel    Morbid obesity (HCC)- (present on admission)  Assessment & Plan  Body mass index is 40.73 kg/m².  Outpatient weight loss management program lifestyle modification highly recommended.

## 2024-02-20 NOTE — ASSESSMENT & PLAN NOTE
Body mass index is 40.73 kg/m².  Outpatient weight loss management program lifestyle modification highly recommended.

## 2024-02-20 NOTE — ASSESSMENT & PLAN NOTE
Patient postoperatively is experiencing hypoxic respiratory failure and is requiring oxygen support  Patient will need bedside oxygen evaluation and possibly going home with oxygen  Patient has a history of sleep apnea and has not been compliant with her sleep apnea machine for about 5 years.  She says she has the machine at home but has lost the tubing to it and has not used it.  This could be causing severe pulmonary hypertension and most recent echocardiogram done down at [facility] does show that she has a left ventricular ejection fraction of 65% but significant pulmonary hypertension with her pulmonary pressures being elevated  Patient will need at this point preload reduction and resumption of her CPAP once she is able to get the necessary tubing for it.

## 2024-02-20 NOTE — PROGRESS NOTES
Received patient from Night shift RN . Patient is awake and alert.On 2 liters  via NC, placed patient on RA, noted SPO2 80%, hooked patient back to 2 liters via NC. Dressing to right shoulder, cdi with sling on.  Fall precaution in place, kept bed in lowest position,bed alarm on  and call light within reach.    0942- Walking O2 test rendered and charted    1300- seen by Hospitalist. Ordered DME O2.     1606- RN Case manager called me and informed , [facility] might not deliver the Home oxygen today, Patient made aware.  Informed charge nurse.

## 2024-02-20 NOTE — CARE PLAN
The patient is Stable - Low risk of patient condition declining or worsening    Shift Goals  Clinical Goals: Patient will be able to wean off from o2, SPO 90% and above while on RA  Patient Goals: go home today  Family Goals: updated with POC    Progress made toward(s) clinical / shift goals:  Tried patient to wean off, patient develops low SPO2, Walking O2 test done and charted, noted SPO2 as low as 83% while on RA    Patient is not progressing towards the following goals:    Problem: Extended Recovery Optimal Care  Goal: Patient will achieve/maintain normal respiratory rate/effort  Outcome: Progressing  Goal: Alleviation or reduction of pain post surgery  Outcome: Progressing  Goal: Early mobilization post surgery  Outcome: Progressing     Problem: Neuro Status  Goal: Neuro status will remain stable or improve  Outcome: Progressing     Problem: Neurovascular Monitoring  Goal: Patient's neurovascular status will be maintained or improve  Outcome: Progressing     Problem: Respiratory/Oxygenation Function Post-Surgical  Goal: Patient will achieve/maintain normal respiratory rate/effort  Outcome: Progressing     Problem: Early Mobilization - Post Surgery  Goal: Early mobilization post surgery  Outcome: Progressing     Problem: Pain - Post Surgery  Goal: Alleviation or reduction of pain post surgery  Outcome: Progressing     Problem: Surgical Drain Management  Goal: Proper management/care of surgical drains will be maintained  Outcome: Progressing     Problem: Bowel Elimination - Post Surgical  Goal: Patient will resume regular bowel sounds and function with no discomfort or distention  Outcome: Progressing     Problem: Respiratory  Goal: Patient will achieve/maintain optimum respiratory ventilation and gas exchange  Outcome: Progressing     Problem: Fall Risk  Goal: Patient will remain free from falls  Outcome: Progressing

## 2024-02-20 NOTE — ASSESSMENT & PLAN NOTE
Optimize blood pressure management keep systolic blood pressure less than 140 diastolic under 90  Continue lisinopril 10 mg p.o. daily

## 2024-02-20 NOTE — ASSESSMENT & PLAN NOTE
Patient smoked until 2008 and she smoked about 30 years.  She has quit smoking since then and at this point has improved but does have underlying COPD and does require nebulizers and RT protocol.

## 2024-02-20 NOTE — CARE PLAN
The patient is Stable - Low risk of patient condition declining or worsening    Shift Goals  Clinical Goals: ambulate, void and wean off from O2  Patient Goals: rest and comfort  Family Goals: updated with POC    Progress made toward(s) clinical / shift goals: Pt able to ambulate and void POD#1. She is now on 2LPM O2 via NC with SPO2 maintained >90%.    Patient is not progressing towards the following goals:

## 2024-02-20 NOTE — ASSESSMENT & PLAN NOTE
Patient has grade 1 diastolic heart failure and has elevated pulmonary pressures due to the sleep apnea  Patient will need to resume CPAP at nighttime  Patient will need preload reduction.  Check BNP level

## 2024-02-21 VITALS
HEIGHT: 62 IN | DIASTOLIC BLOOD PRESSURE: 63 MMHG | WEIGHT: 222.66 LBS | BODY MASS INDEX: 40.98 KG/M2 | HEART RATE: 75 BPM | TEMPERATURE: 97.8 F | RESPIRATION RATE: 18 BRPM | SYSTOLIC BLOOD PRESSURE: 141 MMHG | OXYGEN SATURATION: 90 %

## 2024-02-21 LAB
CHOLEST SERPL-MCNC: 151 MG/DL (ref 100–199)
GLUCOSE BLD STRIP.AUTO-MCNC: 173 MG/DL (ref 65–99)
GLUCOSE BLD STRIP.AUTO-MCNC: 187 MG/DL (ref 65–99)
HDLC SERPL-MCNC: 73 MG/DL
LDLC SERPL CALC-MCNC: 63 MG/DL
TRIGL SERPL-MCNC: 74 MG/DL (ref 0–149)

## 2024-02-21 PROCEDURE — 80061 LIPID PANEL: CPT

## 2024-02-21 PROCEDURE — 770030 HCHG ROOM/CARE - EXTENDED RECOVERY EACH 15 MIN

## 2024-02-21 PROCEDURE — 99239 HOSP IP/OBS DSCHRG MGMT >30: CPT | Performed by: INTERNAL MEDICINE

## 2024-02-21 PROCEDURE — 36415 COLL VENOUS BLD VENIPUNCTURE: CPT

## 2024-02-21 PROCEDURE — 700102 HCHG RX REV CODE 250 W/ 637 OVERRIDE(OP): Performed by: ORTHOPAEDIC SURGERY

## 2024-02-21 PROCEDURE — 82962 GLUCOSE BLOOD TEST: CPT

## 2024-02-21 PROCEDURE — A9270 NON-COVERED ITEM OR SERVICE: HCPCS | Performed by: ORTHOPAEDIC SURGERY

## 2024-02-21 PROCEDURE — 94760 N-INVAS EAR/PLS OXIMETRY 1: CPT

## 2024-02-21 RX ORDER — LISINOPRIL 20 MG/1
20 TABLET ORAL DAILY
Qty: 30 TABLET | Refills: 1 | Status: SHIPPED
Start: 2024-02-22

## 2024-02-21 RX ORDER — LISINOPRIL 20 MG/1
20 TABLET ORAL
Status: DISCONTINUED | OUTPATIENT
Start: 2024-02-22 | End: 2024-02-21 | Stop reason: HOSPADM

## 2024-02-21 RX ADMIN — OXYCODONE HYDROCHLORIDE 5 MG: 5 TABLET ORAL at 13:35

## 2024-02-21 RX ADMIN — LEVOTHYROXINE SODIUM 112 MCG: 0.11 TABLET ORAL at 06:09

## 2024-02-21 RX ADMIN — ATORVASTATIN CALCIUM 10 MG: 10 TABLET, FILM COATED ORAL at 06:09

## 2024-02-21 RX ADMIN — GLIPIZIDE 5 MG: 2.5 TABLET, EXTENDED RELEASE ORAL at 06:09

## 2024-02-21 RX ADMIN — ACETAMINOPHEN 650 MG: 325 TABLET ORAL at 06:09

## 2024-02-21 RX ADMIN — OMEPRAZOLE 20 MG: 20 CAPSULE, DELAYED RELEASE ORAL at 06:09

## 2024-02-21 RX ADMIN — LISINOPRIL 10 MG: 5 TABLET ORAL at 06:09

## 2024-02-21 RX ADMIN — INSULIN HUMAN 2 UNITS: 100 INJECTION, SOLUTION PARENTERAL at 06:12

## 2024-02-21 RX ADMIN — INSULIN HUMAN 2 UNITS: 100 INJECTION, SOLUTION PARENTERAL at 11:48

## 2024-02-21 RX ADMIN — MOMETASONE FUROATE AND FORMOTEROL FUMARATE DIHYDRATE 2 PUFF: 200; 5 AEROSOL RESPIRATORY (INHALATION) at 09:18

## 2024-02-21 RX ADMIN — ASPIRIN 325 MG: 325 TABLET ORAL at 06:09

## 2024-02-21 ASSESSMENT — PAIN DESCRIPTION - PAIN TYPE
TYPE: SURGICAL PAIN
TYPE: ACUTE PAIN

## 2024-02-21 NOTE — CARE PLAN
The patient is Stable - Low risk of patient condition declining or worsening    Shift Goals  Clinical Goals: Pt pain will be tolerable AEB pt being able to sleep at least 5 hours overnight or state pain is 5/10 or less after intervention  Patient Goals: go home today  Family Goals: updated with POC    Progress made toward(s) clinical / shift goals:  Pt was able to sleep 5 hours overnight. Pt required two PO PRN pain medications overnight.

## 2024-02-21 NOTE — PROGRESS NOTES
Assumed care of pt at 1915. Received bedside report from Kunal LANGFORD. Pt resting in bed. Pain rated at a 7/10. Medicated per MAR. No nausea reported. Right shoulder immobilizer in place. R shoulder dressing is CDI. No numbness or tingling reported to PRAVEEN, radial pulse present, able to wiggle fingers. Discussed plan for the night including monitoring oxygen and pain. No other needs at this time, call light in reach, bed in lowest position.

## 2024-02-21 NOTE — DISCHARGE PLANNING
@7988  Agency/Facility Name: [facility]  Spoke To: Ahsan  Outcome: Per Ahsan, per Carmelina,  wait until tomorrow.  Carmelina is going to work on this.  Not sure if the pt has insurance.  Worker's comp will not pay for O2 since pt has COPD.      PRIMITIVO Allred notified via Teams.

## 2024-02-21 NOTE — DISCHARGE PLANNING
Case Management Discharge Planning    Admission Date: 2/19/2024  GMLOS:    ALOS: 0    6-Clicks ADL Score: 18  6-Clicks Mobility Score: 18      Anticipated Discharge Dispo: Discharge Disposition: Discharged to home/self care (01)    DME Needed: Yes    DME Ordered: Yes    Action(s) Taken: Spoke to Shira from [facility]. Shira agreeable to work on O2 with worker's comp since pt is from [place]. Shira to reach out to  in attempt to obtain auth.    0956: Per DPA, [facility] declined to accept O2 referral as they were unable to obtain auth from worker's comp.    Spoke to pt via phone call. Pt states she has insurance through [employer] (member ID #: [id number].    1228: Spoke to Shira from [facility]. Per Gia, they are able to bill pt's insurance as it is a [facility] PPO. Shira provided ETA of about 1400 for O2 delivery.    Escalations Completed: None    Medically Clear: Yes    Next Steps: f/u with [facility] regarding O2 acceptance    Barriers to Discharge: Oxygen Delivery, Worker's comp

## 2024-02-21 NOTE — DISCHARGE PLANNING
0747  Agency/Facility Name: [facility]  Outcome: TAMARA received voicemail from Gia yesterday 02/21/24 @ 1630 pm. Dwight Nielsen can work with Pt workmans comp. Dwight Nielsen will need the contact information for Pt workman comp. Dwight Nielsen to call her back @ [phone]  RN CM notified via Teams.    4655  TAMARA received faxed letter from [facility]. TAMARA placed letter into Pt media file.

## 2024-02-22 NOTE — DISCHARGE SUMMARY
Discharge Summary    CHIEF COMPLAINT ON ADMISSION  Shortness of breath    Reason for Admission  Right rotator cuff tear     Admission Date  2/19/2024    CODE STATUS  Full Code    HPI & HOSPITAL COURSE  This is a 78 y.o. female with a history of hypothyroidism, type 2 diabetes, hyperlipidemia COPD, asthma and obstructive sleep apnea who was admitted for right shoulder pain from a work-related accident.  She was admitted for a rotator cuff repair which was performed on 2/20/2024.  Postoperatively she was unable to wean from oxygen.  She stated that she has been undergoing outpatient workup for chronic respiratory failure and has been told she will likely need oxygen to the very near future due to her chronic pulmonary issues mentioned above.    X-ray was performed and revealed atelectasis.  She used I-S and ambulated however was still unable to wean from oxygen.  Based on her underlying comorbidities it was unlikely she would be weaned from oxygen therefore she was discharged home with 2 L of O2 to use at rest and with ambulation.    She will follow-up with outpatient pulmonology team    Pain from her surgery was well-controlled and she will follow-up with her surgeon in 2 weeks    Therefore, she is discharged in fair and stable condition to home with close outpatient follow-up.    The patient recovered much more quickly than anticipated on admission.    Discharge Date  2/21/2024    FOLLOW UP ITEMS POST DISCHARGE  Follow-up with Dr. Wagner    DISCHARGE DIAGNOSES  Principal Problem:    Right rotator cuff tear (POA: Yes)  Active Problems:    Acute respiratory failure with hypoxia (HCC) (POA: Yes)    Morbid obesity (HCC) (POA: Yes)    COPD (chronic obstructive pulmonary disease) (HCC) (POA: Yes)    Controlled type 2 diabetes mellitus without complication (HCC) (POA: Yes)    Dyslipidemia (POA: Yes)    Hypothyroidism (POA: Yes)    Gastroesophageal reflux disease without esophagitis (POA: Yes)    Hypertension (POA: Yes)     Diastolic heart failure (HCC) (POA: Yes)    Obstructive sleep apnea (POA: Yes)    Aortic stenosis (POA: Yes)  Resolved Problems:    * No resolved hospital problems. *      FOLLOW UP    Dread Wagner M.D.  [address]  [place] [id number]  [phone]    Schedule an appointment as soon as possible for a visit in 2 day(s)  For wound re-check      MEDICATIONS ON DISCHARGE     Medication List        CHANGE how you take these medications        Instructions   lisinopril 20 MG Tabs  Start taking on: February 22, 2024  What changed:   medication strength  See the new instructions.  Commonly known as: Prinivil   Take 1 Tablet by mouth every day.  Dose: 20 mg            CONTINUE taking these medications        Instructions   albuterol 108 (90 Base) MCG/ACT Aers inhalation aerosol   as needed.     Aspirin 325 MG Caps   Take  by mouth every day.     atorvastatin 10 MG Tabs  Commonly known as: Lipitor   every morning.     glipiZIDE ER 5 MG Tb24  Commonly known as: Glucotrol XL   2 times a day.     Jardiance 10 MG Tabs tablet  Generic drug: Empagliflozin   every morning.     levothyroxine 112 MCG Tabs  Commonly known as: Synthroid   every morning.     pantoprazole 40 MG Tbec  Commonly known as: Protonix   every morning.     Vitamin D (Cholecalciferol) 25 MCG (1000 UT) Tabs  Commonly known as: Cholecalciferol   Take 1,000 Units by mouth every day.  Dose: 1,000 Units     Wixela Inhub 500-50 MCG/ACT Aepb  Generic drug: fluticasone-salmeterol   USE 1 INHALATION BY MOUTH  TWICE DAILY              Allergies  Allergies   Allergen Reactions    Other Misc Shortness of Breath     Cats asthmatic reaction       DIET  Orders Placed This Encounter   Procedures    Diet Order Diet: Consistent CHO (Diabetic)     Standing Status:   Standing     Number of Occurrences:   1     Order Specific Question:   Diet:     Answer:   Consistent CHO (Diabetic) [4]       ACTIVITY  As tolerated.  0-lb weight restriction RIGHT  arm    CONSULTATIONS  Dr. Wagner - Ortho    PROCEDURES  2/21: Right shoulder arthroscopy, rotator cuff repair of supraspinatus and subscapularis, biceps tenodesis, subacromial decompression, extensive labral debridement     LABORATORY  Lab Results   Component Value Date    SODIUM 138 02/19/2024    POTASSIUM 4.4 02/19/2024    CHLORIDE 101 02/19/2024    CO2 27 02/19/2024    GLUCOSE 159 (H) 02/19/2024    BUN 17 02/19/2024    CREATININE 0.62 02/19/2024        Lab Results   Component Value Date    WBC 15.1 (H) 02/20/2024    HEMOGLOBIN 13.2 02/20/2024    HEMATOCRIT 41.3 02/20/2024    PLATELETCT 219 02/20/2024        Total time of the discharge process exceeds 42 minutes.

## (undated) DEVICE — TUBING DAY USE W/CARTRIDGE (10EA/BX)

## (undated) DEVICE — DRAPE U SPLIT IMP 54 X 76 - (24/CA)

## (undated) DEVICE — SUTURETAPE FIBERLINK 1.3MM WH/BL (1EA)

## (undated) DEVICE — GLOVE BIOGEL INDICATOR SZ 8 SURGICAL PF LTX - (50/BX 4BX/CA)

## (undated) DEVICE — SHAVER4.0 AGGRESSIVE + FORMLA (5EA/BX)

## (undated) DEVICE — DRAPE LARGE 3 QUARTER - (20/CA)

## (undated) DEVICE — Device

## (undated) DEVICE — DRAPE IOBAN II INCISE 23X17 - (10EA/BX 4BX/CA)

## (undated) DEVICE — CANNULA FULLY THREADED 8 X 75 (5EA/BX)

## (undated) DEVICE — SENSOR OXIMETER ADULT SPO2 RD SET (20EA/BX)

## (undated) DEVICE — STOCKINETTE IMPERVIOUS 12X48 - STERILELF (10/CA)"

## (undated) DEVICE — TUBING CASSETTE CROSSFLOW INTEGRATED (10EA/CA)

## (undated) DEVICE — ELECTRODE DUAL RETURN W/ CORD - (50/PK)

## (undated) DEVICE — SHAVER 5.5 RESECTOR FORMULA (5EA/BX )

## (undated) DEVICE — SUTURE 3-0 ETHILON FS-1 - (36/BX) 30 INCH

## (undated) DEVICE — SUTURE GENERAL

## (undated) DEVICE — GLOVE BIOGEL SZ 7.5 SURGICAL PF LTX - (50PR/BX 4BX/CA)